# Patient Record
(demographics unavailable — no encounter records)

---

## 2024-10-16 NOTE — PROCEDURE
[FreeTextEntry3] : Date of Service: 10/16/2024   Account: [id number]   Patient: ABRAHAN MILLER   YOB: 1964   Age: 60 year     Surgeon: Hedy Cerrato M.D.   Assistant: None.   Pre-Operative Diagnosis:  Spondylosis of Lumbar Region without Myelopathy or Radiculopathy (M47.816)   Post Operative Diagnosis: Spondylosis of Lumbar Region without Myelopathy or Radiculopathy (M47.816)   Procedure: Left L3-4, L4-5, L5-S1  Facet block under fluoroscopic guidance.   Anesthesia:     MAC     This procedure was carried out using fluoroscopic guidance.  The risks and benefits of the procedure were discussed extensively with the patient.  The consent of the patient was obtained and the following procedure was performed.   The patient was placed in the prone position.  The patient's back was prepped and draped in a sterile fashion.  The lumbar vertebral bodies were identified and the fuoroscope left obliqued to approximately 30 degrees to reveal good "Aj-dog" anatomical view.  The junction of the superior articulate process and tranverse process at the L3, L4, and L5 levels were identified and marked. The skin at these target points was then localized using 1 cc of 1% Lidocaine without epinephrine at each injection site.  A spinal needle was then introduced and advanced at these four levels to the above target points at the junction of the SAP and transverse processes until oss was contacted.  Fluoroscope then focused on the left sacral ala on AP view, and marked at this point.  The skin and subcutaneous structures were localized using 1cc of 1.0 % lidocaine without epinephrine.  A spinal needle was then advanced under fluoroscopic guidance until oss was contacted at the ala.  After negative aspiration for heme and CSF, an injectate of 1cc 0.25% marcaine and kenalog was injected at each of the four injection sites.   The needles were then removed and pressure was applied.   Anesthesia was present throughout the procedure.    The patient was instructed to apply ice over the injection site for twenty minutes every two hours for the next 24 to 48 hours.  The patient was also instructed to contact me immediately if there were any problems.   Hedy Cerrato M.D. 
Health Care Proxy (HCP)

## 2024-10-24 NOTE — DATA REVIEWED
[MRI] : MRI [Hip] : hip [Report was reviewed and noted in the chart] : The report was reviewed and noted in the chart [I independently reviewed and interpreted images and report] : I independently reviewed and interpreted images and report [I reviewed the films/CD and agree] : I reviewed the films/CD and agree

## 2024-10-28 NOTE — HISTORY OF PRESENT ILLNESS
[Lower back] : lower back [Radiating] : radiating [Stabbing] : stabbing [Constant] : constant [Household chores] : household chores [Leisure] : leisure [Sleep] : sleep [Walking/activity] : walking/activity [Sitting] : sitting [Lying in bed] : lying in bed [10] : 10 [] : Post Surgical Visit: no [FreeTextEntry1] : lt hip  [FreeTextEntry7] : left buttocks, hip [FreeTextEntry9] : 15 min of walking

## 2024-10-28 NOTE — PHYSICAL EXAM
[4___] : flexion 4[unfilled]/5 [Left] : left hip with pelvis [There are no fractures, subluxations or dislocations. No significant abnormalities are seen] : There are no fractures, subluxations or dislocations. No significant abnormalities are seen [] : no scars [de-identified] : external rotation 30 degrees

## 2024-10-28 NOTE — PHYSICAL EXAM
[4___] : flexion 4[unfilled]/5 [Left] : left hip with pelvis [There are no fractures, subluxations or dislocations. No significant abnormalities are seen] : There are no fractures, subluxations or dislocations. No significant abnormalities are seen [] : no scars [de-identified] : external rotation 30 degrees

## 2024-10-28 NOTE — PROCEDURE
[Large Joint Injection] : Large joint injection [Left] : of the left [Greater Trochanteric Bursa] : greater trochanteric bursa [Pain] : pain [Inflammation] : inflammation [X-ray evidence of Osteoarthritis on this or prior visit] : x-ray evidence of Osteoarthritis on this or prior visit [___ cc    0.25%] : Bupivacaine (Marcaine) ~Vcc of 0.25%  [___ cc    40mg] : Triamcinolone (Kenalog) ~Vcc of 40 mg  [Trigger point 1-2 muscle groups] : trigger point 1-2 muscle groups [Gluteus Cb muscle] : gluteus cb muscle [___ cc    1%] : Lidocaine ~Vcc of 1%

## 2024-10-28 NOTE — ASSESSMENT
